# Patient Record
Sex: FEMALE | Race: ASIAN | NOT HISPANIC OR LATINO
[De-identification: names, ages, dates, MRNs, and addresses within clinical notes are randomized per-mention and may not be internally consistent; named-entity substitution may affect disease eponyms.]

---

## 2017-04-10 PROBLEM — Z00.00 ENCOUNTER FOR PREVENTIVE HEALTH EXAMINATION: Status: ACTIVE | Noted: 2017-04-10

## 2017-04-11 ENCOUNTER — ASOB RESULT (OUTPATIENT)
Age: 37
End: 2017-04-11

## 2017-04-11 ENCOUNTER — APPOINTMENT (OUTPATIENT)
Dept: ANTEPARTUM | Facility: CLINIC | Age: 37
End: 2017-04-11

## 2017-07-24 ENCOUNTER — ASOB RESULT (OUTPATIENT)
Age: 37
End: 2017-07-24

## 2017-07-24 ENCOUNTER — APPOINTMENT (OUTPATIENT)
Dept: ANTEPARTUM | Facility: CLINIC | Age: 37
End: 2017-07-24

## 2017-08-17 ENCOUNTER — TRANSCRIPTION ENCOUNTER (OUTPATIENT)
Age: 37
End: 2017-08-17

## 2017-08-17 ENCOUNTER — INPATIENT (INPATIENT)
Facility: HOSPITAL | Age: 37
LOS: 1 days | Discharge: HOME | End: 2017-08-19
Attending: OBSTETRICS & GYNECOLOGY | Admitting: OBSTETRICS & GYNECOLOGY
Payer: COMMERCIAL

## 2017-08-17 VITALS
HEART RATE: 76 BPM | TEMPERATURE: 99 F | SYSTOLIC BLOOD PRESSURE: 138 MMHG | DIASTOLIC BLOOD PRESSURE: 57 MMHG | RESPIRATION RATE: 18 BRPM

## 2017-08-17 DIAGNOSIS — O26.899 OTHER SPECIFIED PREGNANCY RELATED CONDITIONS, UNSPECIFIED TRIMESTER: ICD-10-CM

## 2017-08-17 DIAGNOSIS — Z3A.00 WEEKS OF GESTATION OF PREGNANCY NOT SPECIFIED: ICD-10-CM

## 2017-08-17 DIAGNOSIS — Z34.80 ENCOUNTER FOR SUPERVISION OF OTHER NORMAL PREGNANCY, UNSPECIFIED TRIMESTER: ICD-10-CM

## 2017-08-17 LAB
BASOPHILS # BLD AUTO: 0 K/UL — SIGNIFICANT CHANGE UP (ref 0–0.2)
BASOPHILS NFR BLD AUTO: 0.3 % — SIGNIFICANT CHANGE UP (ref 0–2)
BLD GP AB SCN SERPL QL: NEGATIVE — SIGNIFICANT CHANGE UP
EOSINOPHIL # BLD AUTO: 0 K/UL — SIGNIFICANT CHANGE UP (ref 0–0.5)
EOSINOPHIL NFR BLD AUTO: 0 % — SIGNIFICANT CHANGE UP (ref 0–6)
HCT VFR BLD CALC: 35.5 % — SIGNIFICANT CHANGE UP (ref 34.5–45)
HGB BLD-MCNC: 12.2 G/DL — SIGNIFICANT CHANGE UP (ref 11.5–15.5)
LYMPHOCYTES # BLD AUTO: 1.4 K/UL — SIGNIFICANT CHANGE UP (ref 1–3.3)
LYMPHOCYTES # BLD AUTO: 8.1 % — LOW (ref 13–44)
MCHC RBC-ENTMCNC: 24.7 PG — LOW (ref 27–34)
MCHC RBC-ENTMCNC: 34.4 GM/DL — SIGNIFICANT CHANGE UP (ref 32–36)
MCV RBC AUTO: 71.8 FL — LOW (ref 80–100)
MONOCYTES # BLD AUTO: 0.8 K/UL — SIGNIFICANT CHANGE UP (ref 0–0.9)
MONOCYTES NFR BLD AUTO: 4.9 % — SIGNIFICANT CHANGE UP (ref 2–14)
NEUTROPHILS # BLD AUTO: 15.1 K/UL — HIGH (ref 1.8–7.4)
NEUTROPHILS NFR BLD AUTO: 86.7 % — HIGH (ref 43–77)
PLATELET # BLD AUTO: 249 K/UL — SIGNIFICANT CHANGE UP (ref 150–400)
RBC # BLD: 4.95 M/UL — SIGNIFICANT CHANGE UP (ref 3.8–5.2)
RBC # FLD: 14.4 % — SIGNIFICANT CHANGE UP (ref 10.3–14.5)
RH IG SCN BLD-IMP: POSITIVE — SIGNIFICANT CHANGE UP
RH IG SCN BLD-IMP: POSITIVE — SIGNIFICANT CHANGE UP
WBC # BLD: 17.4 K/UL — HIGH (ref 3.8–10.5)
WBC # FLD AUTO: 17.4 K/UL — HIGH (ref 3.8–10.5)

## 2017-08-17 RX ORDER — ACETAMINOPHEN 500 MG
3 TABLET ORAL
Qty: 0 | Refills: 0 | DISCHARGE
Start: 2017-08-17

## 2017-08-17 RX ORDER — ACETAMINOPHEN 500 MG
975 TABLET ORAL EVERY 6 HOURS
Qty: 0 | Refills: 0 | Status: DISCONTINUED | OUTPATIENT
Start: 2017-08-17 | End: 2017-08-19

## 2017-08-17 RX ORDER — SODIUM CHLORIDE 9 MG/ML
3 INJECTION INTRAMUSCULAR; INTRAVENOUS; SUBCUTANEOUS EVERY 8 HOURS
Qty: 0 | Refills: 0 | Status: DISCONTINUED | OUTPATIENT
Start: 2017-08-17 | End: 2017-08-19

## 2017-08-17 RX ORDER — CITRIC ACID/SODIUM CITRATE 300-500 MG
15 SOLUTION, ORAL ORAL EVERY 4 HOURS
Qty: 0 | Refills: 0 | Status: DISCONTINUED | OUTPATIENT
Start: 2017-08-17 | End: 2017-08-17

## 2017-08-17 RX ORDER — ACETAMINOPHEN 500 MG
975 TABLET ORAL EVERY 6 HOURS
Qty: 0 | Refills: 0 | Status: COMPLETED | OUTPATIENT
Start: 2017-08-17 | End: 2018-07-16

## 2017-08-17 RX ORDER — MAGNESIUM HYDROXIDE 400 MG/1
30 TABLET, CHEWABLE ORAL
Qty: 0 | Refills: 0 | Status: DISCONTINUED | OUTPATIENT
Start: 2017-08-17 | End: 2017-08-19

## 2017-08-17 RX ORDER — DIPHENHYDRAMINE HCL 50 MG
25 CAPSULE ORAL EVERY 6 HOURS
Qty: 0 | Refills: 0 | Status: DISCONTINUED | OUTPATIENT
Start: 2017-08-17 | End: 2017-08-19

## 2017-08-17 RX ORDER — DOCUSATE SODIUM 100 MG
100 CAPSULE ORAL
Qty: 0 | Refills: 0 | Status: DISCONTINUED | OUTPATIENT
Start: 2017-08-17 | End: 2017-08-18

## 2017-08-17 RX ORDER — DIBUCAINE 1 %
1 OINTMENT (GRAM) RECTAL EVERY 4 HOURS
Qty: 0 | Refills: 0 | Status: DISCONTINUED | OUTPATIENT
Start: 2017-08-17 | End: 2017-08-19

## 2017-08-17 RX ORDER — KETOROLAC TROMETHAMINE 30 MG/ML
30 SYRINGE (ML) INJECTION ONCE
Qty: 0 | Refills: 0 | Status: DISCONTINUED | OUTPATIENT
Start: 2017-08-17 | End: 2017-08-17

## 2017-08-17 RX ORDER — SODIUM CHLORIDE 9 MG/ML
3 INJECTION INTRAMUSCULAR; INTRAVENOUS; SUBCUTANEOUS EVERY 8 HOURS
Qty: 0 | Refills: 0 | Status: DISCONTINUED | OUTPATIENT
Start: 2017-08-17 | End: 2017-08-17

## 2017-08-17 RX ORDER — OXYTOCIN 10 UNIT/ML
41.67 VIAL (ML) INJECTION
Qty: 20 | Refills: 0 | Status: DISCONTINUED | OUTPATIENT
Start: 2017-08-17 | End: 2017-08-17

## 2017-08-17 RX ORDER — IBUPROFEN 200 MG
1 TABLET ORAL
Qty: 0 | Refills: 0 | DISCHARGE
Start: 2017-08-17

## 2017-08-17 RX ORDER — OXYTOCIN 10 UNIT/ML
41.67 VIAL (ML) INJECTION
Qty: 20 | Refills: 0 | Status: DISCONTINUED | OUTPATIENT
Start: 2017-08-17 | End: 2017-08-19

## 2017-08-17 RX ORDER — HYDROCORTISONE 1 %
1 OINTMENT (GRAM) TOPICAL EVERY 4 HOURS
Qty: 0 | Refills: 0 | Status: DISCONTINUED | OUTPATIENT
Start: 2017-08-17 | End: 2017-08-17

## 2017-08-17 RX ORDER — AER TRAVELER 0.5 G/1
1 SOLUTION RECTAL; TOPICAL EVERY 4 HOURS
Qty: 0 | Refills: 0 | Status: DISCONTINUED | OUTPATIENT
Start: 2017-08-17 | End: 2017-08-17

## 2017-08-17 RX ORDER — PRAMOXINE HYDROCHLORIDE 150 MG/15G
1 AEROSOL, FOAM RECTAL EVERY 4 HOURS
Qty: 0 | Refills: 0 | Status: DISCONTINUED | OUTPATIENT
Start: 2017-08-17 | End: 2017-08-17

## 2017-08-17 RX ORDER — SODIUM CHLORIDE 9 MG/ML
500 INJECTION, SOLUTION INTRAVENOUS ONCE
Qty: 0 | Refills: 0 | Status: DISCONTINUED | OUTPATIENT
Start: 2017-08-17 | End: 2017-08-17

## 2017-08-17 RX ORDER — OXYTOCIN 10 UNIT/ML
333.33 VIAL (ML) INJECTION
Qty: 20 | Refills: 0 | Status: DISCONTINUED | OUTPATIENT
Start: 2017-08-17 | End: 2017-08-17

## 2017-08-17 RX ORDER — AER TRAVELER 0.5 G/1
1 SOLUTION RECTAL; TOPICAL EVERY 4 HOURS
Qty: 0 | Refills: 0 | Status: DISCONTINUED | OUTPATIENT
Start: 2017-08-17 | End: 2017-08-19

## 2017-08-17 RX ORDER — SIMETHICONE 80 MG/1
80 TABLET, CHEWABLE ORAL EVERY 6 HOURS
Qty: 0 | Refills: 0 | Status: DISCONTINUED | OUTPATIENT
Start: 2017-08-17 | End: 2017-08-19

## 2017-08-17 RX ORDER — SODIUM CHLORIDE 9 MG/ML
1000 INJECTION, SOLUTION INTRAVENOUS
Qty: 0 | Refills: 0 | Status: DISCONTINUED | OUTPATIENT
Start: 2017-08-17 | End: 2017-08-17

## 2017-08-17 RX ORDER — OXYCODONE HYDROCHLORIDE 5 MG/1
5 TABLET ORAL
Qty: 0 | Refills: 0 | Status: DISCONTINUED | OUTPATIENT
Start: 2017-08-17 | End: 2017-08-19

## 2017-08-17 RX ORDER — LANOLIN
1 OINTMENT (GRAM) TOPICAL EVERY 6 HOURS
Qty: 0 | Refills: 0 | Status: DISCONTINUED | OUTPATIENT
Start: 2017-08-17 | End: 2017-08-19

## 2017-08-17 RX ORDER — TETANUS TOXOID, REDUCED DIPHTHERIA TOXOID AND ACELLULAR PERTUSSIS VACCINE, ADSORBED 5; 2.5; 8; 8; 2.5 [IU]/.5ML; [IU]/.5ML; UG/.5ML; UG/.5ML; UG/.5ML
0.5 SUSPENSION INTRAMUSCULAR ONCE
Qty: 0 | Refills: 0 | Status: DISCONTINUED | OUTPATIENT
Start: 2017-08-17 | End: 2017-08-19

## 2017-08-17 RX ORDER — PRAMOXINE HYDROCHLORIDE 150 MG/15G
1 AEROSOL, FOAM RECTAL EVERY 4 HOURS
Qty: 0 | Refills: 0 | Status: DISCONTINUED | OUTPATIENT
Start: 2017-08-17 | End: 2017-08-19

## 2017-08-17 RX ORDER — OXYCODONE HYDROCHLORIDE 5 MG/1
5 TABLET ORAL EVERY 4 HOURS
Qty: 0 | Refills: 0 | Status: DISCONTINUED | OUTPATIENT
Start: 2017-08-17 | End: 2017-08-19

## 2017-08-17 RX ORDER — HYDROCORTISONE 1 %
1 OINTMENT (GRAM) TOPICAL EVERY 4 HOURS
Qty: 0 | Refills: 0 | Status: DISCONTINUED | OUTPATIENT
Start: 2017-08-17 | End: 2017-08-19

## 2017-08-17 RX ORDER — BENZOCAINE 10 %
1 GEL (GRAM) MUCOUS MEMBRANE EVERY 6 HOURS
Qty: 0 | Refills: 0 | Status: DISCONTINUED | OUTPATIENT
Start: 2017-08-17 | End: 2017-08-19

## 2017-08-17 RX ORDER — DIBUCAINE 1 %
1 OINTMENT (GRAM) RECTAL EVERY 4 HOURS
Qty: 0 | Refills: 0 | Status: DISCONTINUED | OUTPATIENT
Start: 2017-08-17 | End: 2017-08-17

## 2017-08-17 RX ORDER — IBUPROFEN 200 MG
600 TABLET ORAL EVERY 6 HOURS
Qty: 0 | Refills: 0 | Status: DISCONTINUED | OUTPATIENT
Start: 2017-08-17 | End: 2017-08-19

## 2017-08-17 RX ORDER — GLYCERIN ADULT
1 SUPPOSITORY, RECTAL RECTAL AT BEDTIME
Qty: 0 | Refills: 0 | Status: DISCONTINUED | OUTPATIENT
Start: 2017-08-17 | End: 2017-08-19

## 2017-08-17 RX ORDER — IBUPROFEN 200 MG
600 TABLET ORAL EVERY 6 HOURS
Qty: 0 | Refills: 0 | Status: COMPLETED | OUTPATIENT
Start: 2017-08-17 | End: 2018-07-16

## 2017-08-17 RX ADMIN — Medication 125 MILLIUNIT(S)/MIN: at 05:37

## 2017-08-17 RX ADMIN — Medication 600 MILLIGRAM(S): at 20:54

## 2017-08-17 RX ADMIN — SODIUM CHLORIDE 125 MILLILITER(S): 9 INJECTION, SOLUTION INTRAVENOUS at 03:20

## 2017-08-17 RX ADMIN — Medication 600 MILLIGRAM(S): at 20:25

## 2017-08-17 RX ADMIN — Medication 600 MILLIGRAM(S): at 15:00

## 2017-08-17 RX ADMIN — Medication 100 MILLIGRAM(S): at 14:03

## 2017-08-17 RX ADMIN — Medication 30 MILLIGRAM(S): at 07:45

## 2017-08-17 RX ADMIN — Medication 1 TABLET(S): at 14:03

## 2017-08-17 RX ADMIN — Medication 975 MILLIGRAM(S): at 09:22

## 2017-08-17 RX ADMIN — Medication 975 MILLIGRAM(S): at 18:40

## 2017-08-17 RX ADMIN — Medication 600 MILLIGRAM(S): at 14:03

## 2017-08-17 RX ADMIN — Medication 975 MILLIGRAM(S): at 10:20

## 2017-08-17 RX ADMIN — Medication 975 MILLIGRAM(S): at 17:44

## 2017-08-17 NOTE — DISCHARGE NOTE OB - PLAN OF CARE
Return to normal activity Regular diet as tolerated. Ambulate as tolerated. Regular diet as tolerated. Ambulate as tolerated., f/u 6 weeks

## 2017-08-17 NOTE — DISCHARGE NOTE OB - CARE PROVIDER_API CALL
Angel Hernández (MD), Obstetrics and Gynecology  [address] [place] [address]  Phone: [phone]  Fax: [phone] Angel Hernández (MD), Obstetrics and Gynecology  [address] [place] [address]  Phone: [phone]  Fax: [phone]

## 2017-08-17 NOTE — DISCHARGE NOTE OB - HOSPITAL COURSE
Section Delivered. Uncomplicated Postpartum course. Will follow up as an outpatient. term pregnancy, labor,  viable female infant, midline laceration repair, post partum uncomplicated

## 2017-08-17 NOTE — DISCHARGE NOTE OB - PATIENT PORTAL LINK FT
“You can access the [facility] Patient Portal, offered by [facility], by registering with the following website: [email or web address]”

## 2017-08-17 NOTE — DISCHARGE NOTE OB - MATERIALS PROVIDED
[facility] Hearing Screen Program/Breastfeeding Mother’s Support Group Information/[facility] [place] Screening Program/Breastfeeding Guide and Packet/Breastfeeding Log/Birth Certificate Instructions/Guide to Postpartum Care/MMR Vaccination (VIS Pub Date: 2012)

## 2017-08-17 NOTE — DISCHARGE NOTE OB - CARE PLAN
Principal Discharge DX:	 delivery delivered  Goal:	Return to normal activity  Instructions for follow-up, activity and diet:	Regular diet as tolerated. Ambulate as tolerated. Principal Discharge DX:	Vaginal delivery  Goal:	Return to normal activity  Instructions for follow-up, activity and diet:	Regular diet as tolerated. Ambulate as tolerated., f/u 6 weeks

## 2017-08-18 LAB
HCT VFR BLD CALC: 26.3 % — LOW (ref 34.5–45)
HGB BLD-MCNC: 8.6 G/DL — LOW (ref 11.5–15.5)

## 2017-08-18 RX ORDER — DOCUSATE SODIUM 100 MG
100 CAPSULE ORAL THREE TIMES A DAY
Qty: 0 | Refills: 0 | Status: DISCONTINUED | OUTPATIENT
Start: 2017-08-18 | End: 2017-08-19

## 2017-08-18 RX ORDER — ASCORBIC ACID 60 MG
250 TABLET,CHEWABLE ORAL THREE TIMES A DAY
Qty: 0 | Refills: 0 | Status: DISCONTINUED | OUTPATIENT
Start: 2017-08-18 | End: 2017-08-19

## 2017-08-18 RX ORDER — FERROUS SULFATE 325(65) MG
325 TABLET ORAL
Qty: 0 | Refills: 0 | Status: DISCONTINUED | OUTPATIENT
Start: 2017-08-18 | End: 2017-08-19

## 2017-08-18 RX ADMIN — Medication 250 MILLIGRAM(S): at 23:20

## 2017-08-18 RX ADMIN — Medication 600 MILLIGRAM(S): at 06:25

## 2017-08-18 RX ADMIN — Medication 100 MILLIGRAM(S): at 23:20

## 2017-08-18 RX ADMIN — Medication 600 MILLIGRAM(S): at 05:56

## 2017-08-18 RX ADMIN — Medication 1 TABLET(S): at 11:54

## 2017-08-18 RX ADMIN — Medication 975 MILLIGRAM(S): at 00:05

## 2017-08-18 RX ADMIN — Medication 975 MILLIGRAM(S): at 00:35

## 2017-08-18 RX ADMIN — Medication 600 MILLIGRAM(S): at 12:24

## 2017-08-18 RX ADMIN — Medication 600 MILLIGRAM(S): at 23:50

## 2017-08-18 RX ADMIN — Medication 600 MILLIGRAM(S): at 18:13

## 2017-08-18 RX ADMIN — Medication 600 MILLIGRAM(S): at 23:20

## 2017-08-18 RX ADMIN — Medication 600 MILLIGRAM(S): at 11:54

## 2017-08-18 RX ADMIN — Medication 325 MILLIGRAM(S): at 23:20

## 2017-08-18 NOTE — PROGRESS NOTE ADULT - PROBLEM SELECTOR PLAN 1
- Continue with po analgesia  - Increase ambulation  - Continue regular diet  - IV lock  - H&H today    SHIRA Landis, PGY-1

## 2017-08-18 NOTE — PROGRESS NOTE ADULT - SUBJECTIVE AND OBJECTIVE BOX
Patient seen and examined at bedside, no acute overnight events. No acute complaints, pain well controlled. Patient is ambulating, voiding spontaneously, passing gas, and tolerating regular diet. Pt is breast feeding her baby.    Vital Signs Last 24 Hours  T(C): 36.8 (08-18-17 @ 06:15), Max: 37 (08-17-17 @ 17:00)  HR: 63 (08-18-17 @ 06:15) (63 - 81)  BP: 103/61 (08-18-17 @ 06:15) (99/66 - 111/68)  RR: 18 (08-18-17 @ 06:15) (18 - 18)  SpO2: --    Physical Exam:  General: NAD  Abdomen: Soft, non-tender, non-distended  Pelvic: Lochia wnl; fundus firm and non-tender   Extremities: no calf tenderness noted on exam    Labs:    Blood Type: O Positive               12.2   17.4  )-----------( 249      ( 08-17 @ 04:06 )             35.5         MEDICATIONS  (STANDING):  oxyCODONE    IR 5 milliGRAM(s) Oral every 3 hours  sodium chloride 0.9% lock flush 3 milliLiter(s) IV Push every 8 hours  diphtheria/tetanus/pertussis (acellular) Vaccine (ADAcel) 0.5 milliLiter(s) IntraMuscular once  oxytocin Infusion 41.667 milliUNIT(s)/Min (125 mL/Hr) IV Continuous <Continuous>  prenatal multivitamin 1 Tablet(s) Oral daily  acetaminophen   Tablet. 975 milliGRAM(s) Oral every 6 hours  ibuprofen  Tablet 600 milliGRAM(s) Oral every 6 hours  measles/mumps/rubella Vaccine 0.5 milliLiter(s) SubCutaneous once    MEDICATIONS  (PRN):  oxyCODONE    IR 5 milliGRAM(s) Oral every 4 hours PRN Severe Pain (7 -10)  hydrocortisone 1% Cream 1 Application(s) Topical every 4 hours PRN Moderate to Severe Perineal Pain  pramoxine 1%/zinc 5% Cream 1 Application(s) Topical every 4 hours PRN Moderate to Severe Perineal Pain  benzocaine 20%/menthol 0.5% Spray 1 Spray(s) Topical every 6 hours PRN Perineal discomfort  dibucaine 1% Ointment 1 Application(s) Topical every 4 hours PRN Perineal Discomfort  lanolin Ointment 1 Application(s) Topical every 6 hours PRN Sore Nipples  witch hazel Pads 1 Application(s) Topical every 4 hours PRN Perineal Discomfort  simethicone 80 milliGRAM(s) Chew every 6 hours PRN Gas  diphenhydrAMINE   Capsule 25 milliGRAM(s) Oral every 6 hours PRN Itching  glycerin Suppository - Adult 1 Suppository(s) Rectal at bedtime PRN Constipation  magnesium hydroxide Suspension 30 milliLiter(s) Oral two times a day PRN Constipation  docusate sodium 100 milliGRAM(s) Oral two times a day PRN Stool Softening

## 2017-08-18 NOTE — PROGRESS NOTE ADULT - ATTENDING COMMENTS
Patient seen and examined by me.   Agree with above assessment and plan.  Continue with current care.

## 2017-08-18 NOTE — CHART NOTE - NSCHARTNOTEFT_GEN_A_CORE
NP NOTE     Day 1    Vital Signs Last 24 Hrs  T(C): 36.8 (18 Aug 2017 06:15), Max: 37 (17 Aug 2017 17:00)  T(F): 98.3 (18 Aug 2017 06:15), Max: 98.6 (17 Aug 2017 17:00)  HR: 63 (18 Aug 2017 06:15) (63 - 79)  BP: 103/61 (18 Aug 2017 06:15) (103/61 - 111/68)  BP(mean): --  RR: 18 (18 Aug 2017 06:15) (18 - 18)  SpO2: --                          8.6    x     )-----------( x        ( 18 Aug 2017 07:21 )             26.3     8.6/26.3      Plan:  - Ferrous Sulfate, Colace, Vitamin C supplementation.  - Monitor for signs/symptoms of anemia.    Mary Eller NP

## 2017-08-19 VITALS
DIASTOLIC BLOOD PRESSURE: 73 MMHG | RESPIRATION RATE: 18 BRPM | HEART RATE: 76 BPM | TEMPERATURE: 98 F | SYSTOLIC BLOOD PRESSURE: 107 MMHG

## 2017-08-19 LAB — T PALLIDUM AB TITR SER: NEGATIVE — SIGNIFICANT CHANGE UP

## 2017-08-19 PROCEDURE — 86900 BLOOD TYPING SEROLOGIC ABO: CPT

## 2017-08-19 PROCEDURE — 86780 TREPONEMA PALLIDUM: CPT

## 2017-08-19 PROCEDURE — 59050 FETAL MONITOR W/REPORT: CPT

## 2017-08-19 PROCEDURE — 85018 HEMOGLOBIN: CPT

## 2017-08-19 PROCEDURE — 86850 RBC ANTIBODY SCREEN: CPT

## 2017-08-19 PROCEDURE — 59025 FETAL NON-STRESS TEST: CPT

## 2017-08-19 PROCEDURE — G0463: CPT

## 2017-08-19 PROCEDURE — 90707 MMR VACCINE SC: CPT

## 2017-08-19 PROCEDURE — 85027 COMPLETE CBC AUTOMATED: CPT

## 2017-08-19 PROCEDURE — 86901 BLOOD TYPING SEROLOGIC RH(D): CPT

## 2017-08-19 RX ADMIN — Medication 600 MILLIGRAM(S): at 05:37

## 2017-08-19 RX ADMIN — Medication 600 MILLIGRAM(S): at 06:07

## 2017-08-19 RX ADMIN — Medication 325 MILLIGRAM(S): at 08:08

## 2017-08-19 RX ADMIN — Medication 100 MILLIGRAM(S): at 11:34

## 2017-08-19 RX ADMIN — Medication 600 MILLIGRAM(S): at 11:34

## 2017-08-19 RX ADMIN — Medication 100 MILLIGRAM(S): at 05:37

## 2017-08-19 RX ADMIN — Medication 250 MILLIGRAM(S): at 05:37

## 2017-08-19 RX ADMIN — Medication 0.5 MILLILITER(S): at 10:21

## 2017-08-19 RX ADMIN — Medication 600 MILLIGRAM(S): at 12:00

## 2017-08-19 RX ADMIN — Medication 1 TABLET(S): at 11:34

## 2017-08-19 NOTE — PROGRESS NOTE ADULT - SUBJECTIVE AND OBJECTIVE BOX
PPD#2    S: Patient doing well. Minimal lochia. Pain controlled.    O: Vital Signs Last 24 Hrs  T(C): 36.7 (19 Aug 2017 06:25), Max: 36.7 (19 Aug 2017 06:25)  T(F): 98 (19 Aug 2017 06:25), Max: 98 (19 Aug 2017 06:25)  HR: 76 (19 Aug 2017 06:25) (76 - 83)  BP: 107/73 (19 Aug 2017 06:25) (107/73 - 113/75)  BP(mean): --  RR: 18 (19 Aug 2017 06:25) (18 - 18)  SpO2: 97% (18 Aug 2017 18:10) (97% - 97%)    Gen: NAD  Abd: soft, NT, ND, fundus firm below umbilicus  Lochia: moderate  Ext: no tenderness    Labs:                        8.6    x     )-----------( x        ( 18 Aug 2017 07:21 )             26.3       A: 37y PPD# s/p  doing well.    Plan:  Analgesia prn  Regular diet  Discharge instruction given

## 2019-02-13 ENCOUNTER — LABORATORY RESULT (OUTPATIENT)
Age: 39
End: 2019-02-13

## 2019-02-13 ENCOUNTER — ASOB RESULT (OUTPATIENT)
Age: 39
End: 2019-02-13

## 2019-02-13 ENCOUNTER — APPOINTMENT (OUTPATIENT)
Dept: ANTEPARTUM | Facility: CLINIC | Age: 39
End: 2019-02-13
Payer: COMMERCIAL

## 2019-02-13 PROCEDURE — 36415 COLL VENOUS BLD VENIPUNCTURE: CPT

## 2019-02-13 PROCEDURE — 76813 OB US NUCHAL MEAS 1 GEST: CPT

## 2019-02-13 PROCEDURE — 76801 OB US < 14 WKS SINGLE FETUS: CPT

## 2019-04-08 ENCOUNTER — ASOB RESULT (OUTPATIENT)
Age: 39
End: 2019-04-08

## 2019-04-08 ENCOUNTER — APPOINTMENT (OUTPATIENT)
Dept: ANTEPARTUM | Facility: CLINIC | Age: 39
End: 2019-04-08
Payer: COMMERCIAL

## 2019-04-08 PROCEDURE — 76811 OB US DETAILED SNGL FETUS: CPT

## 2019-08-17 ENCOUNTER — INPATIENT (INPATIENT)
Facility: HOSPITAL | Age: 39
LOS: 1 days | Discharge: HOME | End: 2019-08-19
Attending: OBSTETRICS & GYNECOLOGY | Admitting: OBSTETRICS & GYNECOLOGY
Payer: COMMERCIAL

## 2019-08-17 VITALS
DIASTOLIC BLOOD PRESSURE: 65 MMHG | RESPIRATION RATE: 14 BRPM | TEMPERATURE: 98 F | HEART RATE: 88 BPM | SYSTOLIC BLOOD PRESSURE: 112 MMHG

## 2019-08-17 DIAGNOSIS — Z3A.00 WEEKS OF GESTATION OF PREGNANCY NOT SPECIFIED: ICD-10-CM

## 2019-08-17 DIAGNOSIS — Z34.80 ENCOUNTER FOR SUPERVISION OF OTHER NORMAL PREGNANCY, UNSPECIFIED TRIMESTER: ICD-10-CM

## 2019-08-17 DIAGNOSIS — O26.899 OTHER SPECIFIED PREGNANCY RELATED CONDITIONS, UNSPECIFIED TRIMESTER: ICD-10-CM

## 2019-08-17 LAB
BLD GP AB SCN SERPL QL: NEGATIVE — SIGNIFICANT CHANGE UP
HCT VFR BLD CALC: 31.8 % — LOW (ref 34.5–45)
HGB BLD-MCNC: 10.7 G/DL — LOW (ref 11.5–15.5)
MCHC RBC-ENTMCNC: 23 PG — LOW (ref 27–34)
MCHC RBC-ENTMCNC: 33.6 GM/DL — SIGNIFICANT CHANGE UP (ref 32–36)
MCV RBC AUTO: 68.4 FL — LOW (ref 80–100)
PLATELET # BLD AUTO: 297 K/UL — SIGNIFICANT CHANGE UP (ref 150–400)
RBC # BLD: 4.65 M/UL — SIGNIFICANT CHANGE UP (ref 3.8–5.2)
RBC # FLD: 16 % — HIGH (ref 10.3–14.5)
RH IG SCN BLD-IMP: POSITIVE — SIGNIFICANT CHANGE UP
WBC # BLD: 12.8 K/UL — HIGH (ref 3.8–10.5)
WBC # FLD AUTO: 12.8 K/UL — HIGH (ref 3.8–10.5)

## 2019-08-17 RX ORDER — OXYCODONE HYDROCHLORIDE 5 MG/1
5 TABLET ORAL ONCE
Refills: 0 | Status: DISCONTINUED | OUTPATIENT
Start: 2019-08-17 | End: 2019-08-19

## 2019-08-17 RX ORDER — DIPHENHYDRAMINE HCL 50 MG
25 CAPSULE ORAL EVERY 6 HOURS
Refills: 0 | Status: DISCONTINUED | OUTPATIENT
Start: 2019-08-17 | End: 2019-08-19

## 2019-08-17 RX ORDER — SODIUM CHLORIDE 9 MG/ML
500 INJECTION, SOLUTION INTRAVENOUS ONCE
Refills: 0 | Status: COMPLETED | OUTPATIENT
Start: 2019-08-17 | End: 2019-08-17

## 2019-08-17 RX ORDER — LANOLIN
1 OINTMENT (GRAM) TOPICAL EVERY 6 HOURS
Refills: 0 | Status: DISCONTINUED | OUTPATIENT
Start: 2019-08-17 | End: 2019-08-19

## 2019-08-17 RX ORDER — GLYCERIN ADULT
1 SUPPOSITORY, RECTAL RECTAL AT BEDTIME
Refills: 0 | Status: DISCONTINUED | OUTPATIENT
Start: 2019-08-17 | End: 2019-08-19

## 2019-08-17 RX ORDER — AER TRAVELER 0.5 G/1
1 SOLUTION RECTAL; TOPICAL EVERY 4 HOURS
Refills: 0 | Status: DISCONTINUED | OUTPATIENT
Start: 2019-08-17 | End: 2019-08-19

## 2019-08-17 RX ORDER — TETANUS TOXOID, REDUCED DIPHTHERIA TOXOID AND ACELLULAR PERTUSSIS VACCINE, ADSORBED 5; 2.5; 8; 8; 2.5 [IU]/.5ML; [IU]/.5ML; UG/.5ML; UG/.5ML; UG/.5ML
0.5 SUSPENSION INTRAMUSCULAR ONCE
Refills: 0 | Status: DISCONTINUED | OUTPATIENT
Start: 2019-08-17 | End: 2019-08-19

## 2019-08-17 RX ORDER — OXYTOCIN 10 UNIT/ML
333.33 VIAL (ML) INJECTION
Qty: 20 | Refills: 0 | Status: DISCONTINUED | OUTPATIENT
Start: 2019-08-17 | End: 2019-08-19

## 2019-08-17 RX ORDER — IBUPROFEN 200 MG
600 TABLET ORAL EVERY 6 HOURS
Refills: 0 | Status: COMPLETED | OUTPATIENT
Start: 2019-08-17 | End: 2020-07-15

## 2019-08-17 RX ORDER — KETOROLAC TROMETHAMINE 30 MG/ML
30 SYRINGE (ML) INJECTION ONCE
Refills: 0 | Status: DISCONTINUED | OUTPATIENT
Start: 2019-08-17 | End: 2019-08-17

## 2019-08-17 RX ORDER — DOCUSATE SODIUM 100 MG
100 CAPSULE ORAL
Refills: 0 | Status: DISCONTINUED | OUTPATIENT
Start: 2019-08-17 | End: 2019-08-19

## 2019-08-17 RX ORDER — OXYCODONE HYDROCHLORIDE 5 MG/1
5 TABLET ORAL
Refills: 0 | Status: DISCONTINUED | OUTPATIENT
Start: 2019-08-17 | End: 2019-08-19

## 2019-08-17 RX ORDER — MAGNESIUM HYDROXIDE 400 MG/1
30 TABLET, CHEWABLE ORAL
Refills: 0 | Status: DISCONTINUED | OUTPATIENT
Start: 2019-08-17 | End: 2019-08-19

## 2019-08-17 RX ORDER — BENZOCAINE 10 %
1 GEL (GRAM) MUCOUS MEMBRANE EVERY 6 HOURS
Refills: 0 | Status: DISCONTINUED | OUTPATIENT
Start: 2019-08-17 | End: 2019-08-19

## 2019-08-17 RX ORDER — HYDROCORTISONE 1 %
1 OINTMENT (GRAM) TOPICAL EVERY 6 HOURS
Refills: 0 | Status: DISCONTINUED | OUTPATIENT
Start: 2019-08-17 | End: 2019-08-19

## 2019-08-17 RX ORDER — SIMETHICONE 80 MG/1
80 TABLET, CHEWABLE ORAL EVERY 4 HOURS
Refills: 0 | Status: DISCONTINUED | OUTPATIENT
Start: 2019-08-17 | End: 2019-08-19

## 2019-08-17 RX ORDER — DIBUCAINE 1 %
1 OINTMENT (GRAM) RECTAL EVERY 6 HOURS
Refills: 0 | Status: DISCONTINUED | OUTPATIENT
Start: 2019-08-17 | End: 2019-08-19

## 2019-08-17 RX ORDER — IBUPROFEN 200 MG
600 TABLET ORAL EVERY 6 HOURS
Refills: 0 | Status: DISCONTINUED | OUTPATIENT
Start: 2019-08-17 | End: 2019-08-19

## 2019-08-17 RX ORDER — PRAMOXINE HYDROCHLORIDE 150 MG/15G
1 AEROSOL, FOAM RECTAL EVERY 4 HOURS
Refills: 0 | Status: DISCONTINUED | OUTPATIENT
Start: 2019-08-17 | End: 2019-08-19

## 2019-08-17 RX ORDER — SODIUM CHLORIDE 9 MG/ML
3 INJECTION INTRAMUSCULAR; INTRAVENOUS; SUBCUTANEOUS EVERY 8 HOURS
Refills: 0 | Status: DISCONTINUED | OUTPATIENT
Start: 2019-08-17 | End: 2019-08-19

## 2019-08-17 RX ORDER — ACETAMINOPHEN 500 MG
975 TABLET ORAL
Refills: 0 | Status: DISCONTINUED | OUTPATIENT
Start: 2019-08-17 | End: 2019-08-19

## 2019-08-17 RX ADMIN — Medication 30 MILLIGRAM(S): at 15:15

## 2019-08-17 RX ADMIN — Medication 1000 MILLIUNIT(S)/MIN: at 15:13

## 2019-08-17 RX ADMIN — SODIUM CHLORIDE 1000 MILLILITER(S): 9 INJECTION, SOLUTION INTRAVENOUS at 15:00

## 2019-08-17 RX ADMIN — Medication 975 MILLIGRAM(S): at 21:59

## 2019-08-17 RX ADMIN — Medication 975 MILLIGRAM(S): at 21:29

## 2019-08-18 LAB
HCT VFR BLD CALC: 22.9 % — LOW (ref 34.5–45)
HCT VFR BLD CALC: 23.6 % — LOW (ref 34.5–45)
HGB BLD-MCNC: 7.2 G/DL — LOW (ref 11.5–15.5)
HGB BLD-MCNC: 7.5 G/DL — LOW (ref 11.5–15.5)
MCHC RBC-ENTMCNC: 22 PG — LOW (ref 27–34)
MCHC RBC-ENTMCNC: 32 GM/DL — SIGNIFICANT CHANGE UP (ref 32–36)
MCV RBC AUTO: 68.8 FL — LOW (ref 80–100)
PLATELET # BLD AUTO: 290 K/UL — SIGNIFICANT CHANGE UP (ref 150–400)
RBC # BLD: 3.43 M/UL — LOW (ref 3.8–5.2)
RBC # FLD: 16.1 % — HIGH (ref 10.3–14.5)
T PALLIDUM AB TITR SER: NEGATIVE — SIGNIFICANT CHANGE UP
WBC # BLD: 14.8 K/UL — HIGH (ref 3.8–10.5)
WBC # FLD AUTO: 14.8 K/UL — HIGH (ref 3.8–10.5)

## 2019-08-18 RX ADMIN — Medication 600 MILLIGRAM(S): at 18:45

## 2019-08-18 RX ADMIN — Medication 975 MILLIGRAM(S): at 21:37

## 2019-08-18 RX ADMIN — Medication 1 TABLET(S): at 12:12

## 2019-08-18 RX ADMIN — Medication 600 MILLIGRAM(S): at 06:23

## 2019-08-18 RX ADMIN — Medication 975 MILLIGRAM(S): at 08:09

## 2019-08-18 RX ADMIN — Medication 600 MILLIGRAM(S): at 01:04

## 2019-08-18 RX ADMIN — Medication 975 MILLIGRAM(S): at 16:10

## 2019-08-18 RX ADMIN — Medication 600 MILLIGRAM(S): at 12:50

## 2019-08-18 RX ADMIN — Medication 600 MILLIGRAM(S): at 00:34

## 2019-08-18 RX ADMIN — Medication 600 MILLIGRAM(S): at 12:12

## 2019-08-18 RX ADMIN — Medication 975 MILLIGRAM(S): at 15:35

## 2019-08-18 RX ADMIN — Medication 600 MILLIGRAM(S): at 19:15

## 2019-08-18 RX ADMIN — Medication 975 MILLIGRAM(S): at 08:40

## 2019-08-18 RX ADMIN — Medication 975 MILLIGRAM(S): at 03:19

## 2019-08-18 RX ADMIN — Medication 975 MILLIGRAM(S): at 03:49

## 2019-08-18 RX ADMIN — Medication 600 MILLIGRAM(S): at 05:53

## 2019-08-18 RX ADMIN — Medication 975 MILLIGRAM(S): at 21:07

## 2019-08-18 NOTE — PROGRESS NOTE ADULT - PROBLEM SELECTOR PLAN 1
- Pain well controlled, continue current pain regimen  - Continue ambulation, SCDs when not ambulating  - Continue regular diet  - f/u AM H/H    Leslie Smith, PGY-1

## 2019-08-18 NOTE — PROGRESS NOTE ADULT - ATTENDING COMMENTS
Doing well  VSS Afeb  Abd S NT ND FF min lochia  inc c/d/i  S/P , stable  Hgb low, will rpt to check stability  Routine Care

## 2019-08-18 NOTE — PROGRESS NOTE ADULT - SUBJECTIVE AND OBJECTIVE BOX
OB Progress Note:  PPD#1    S: 38yo PPD#1 s/p . Patient feels well. Pain is well controlled. She is tolerating a regular diet and passing flatus. She is voiding spontaneously, and ambulating without difficulty. Endorses light vaginal bleeding, soaking less than 1 pad/hour. Denies CP/SOB. Denies lightheadedness/dizziness. Denies N/V.     O:  Vitals:  Vital Signs Last 24 Hrs  T(C): 37.3 (18 Aug 2019 00:33), Max: 37.3 (18 Aug 2019 00:33)  T(F): 99.2 (18 Aug 2019 00:33), Max: 99.2 (18 Aug 2019 00:33)  HR: 83 (18 Aug 2019 00:33) (75 - 97)  BP: 92/60 (18 Aug 2019 00:33) (90/63 - 112/65)  BP(mean): --  RR: 18 (18 Aug 2019 00:33) (10 - 18)  SpO2: 97% (18 Aug 2019 00:33) (97% - 98%)    MEDICATIONS  (STANDING):  acetaminophen   Tablet .. 975 milliGRAM(s) Oral <User Schedule>  diphtheria/tetanus/pertussis (acellular) Vaccine (ADAcel) 0.5 milliLiter(s) IntraMuscular once  ibuprofen  Tablet. 600 milliGRAM(s) Oral every 6 hours  oxytocin Infusion 333.333 milliUNIT(s)/Min (1000 mL/Hr) IV Continuous <Continuous>  prenatal multivitamin 1 Tablet(s) Oral daily  sodium chloride 0.9% lock flush 3 milliLiter(s) IV Push every 8 hours      Labs:  Blood type: O Positive  Rubella IgG: RPR: Negative                          10.7<L>   12.8<H> >-----------< 297    ( 17 @ 15:20 )             31.8<L>                  Physical Exam:  General: NAD  Heart: all extremities well perfused  Lungs: breathing comfortably  Abdomen: soft, non-tender, non-distended, fundus firm  Vaginal: lochia wnl  Extremities: No calf tenderness or erythema

## 2019-08-19 ENCOUNTER — TRANSCRIPTION ENCOUNTER (OUTPATIENT)
Age: 39
End: 2019-08-19

## 2019-08-19 VITALS
HEART RATE: 70 BPM | SYSTOLIC BLOOD PRESSURE: 99 MMHG | TEMPERATURE: 98 F | RESPIRATION RATE: 18 BRPM | DIASTOLIC BLOOD PRESSURE: 64 MMHG | OXYGEN SATURATION: 98 %

## 2019-08-19 PROCEDURE — 86780 TREPONEMA PALLIDUM: CPT

## 2019-08-19 PROCEDURE — 86901 BLOOD TYPING SEROLOGIC RH(D): CPT

## 2019-08-19 PROCEDURE — 86900 BLOOD TYPING SEROLOGIC ABO: CPT

## 2019-08-19 PROCEDURE — 59025 FETAL NON-STRESS TEST: CPT

## 2019-08-19 PROCEDURE — 86850 RBC ANTIBODY SCREEN: CPT

## 2019-08-19 PROCEDURE — 85018 HEMOGLOBIN: CPT

## 2019-08-19 PROCEDURE — 59050 FETAL MONITOR W/REPORT: CPT

## 2019-08-19 PROCEDURE — 85014 HEMATOCRIT: CPT

## 2019-08-19 PROCEDURE — G0463: CPT

## 2019-08-19 PROCEDURE — 85027 COMPLETE CBC AUTOMATED: CPT

## 2019-08-19 RX ADMIN — Medication 600 MILLIGRAM(S): at 12:44

## 2019-08-19 RX ADMIN — Medication 975 MILLIGRAM(S): at 09:40

## 2019-08-19 RX ADMIN — Medication 1 TABLET(S): at 12:14

## 2019-08-19 RX ADMIN — Medication 600 MILLIGRAM(S): at 06:13

## 2019-08-19 RX ADMIN — Medication 600 MILLIGRAM(S): at 00:01

## 2019-08-19 RX ADMIN — Medication 975 MILLIGRAM(S): at 04:02

## 2019-08-19 RX ADMIN — Medication 100 MILLIGRAM(S): at 12:14

## 2019-08-19 RX ADMIN — Medication 975 MILLIGRAM(S): at 03:32

## 2019-08-19 RX ADMIN — Medication 600 MILLIGRAM(S): at 00:31

## 2019-08-19 RX ADMIN — Medication 600 MILLIGRAM(S): at 06:43

## 2019-08-19 RX ADMIN — Medication 975 MILLIGRAM(S): at 09:10

## 2019-08-19 RX ADMIN — Medication 600 MILLIGRAM(S): at 12:14

## 2019-08-19 NOTE — DISCHARGE NOTE OB - CARE PLAN
Principal Discharge DX:	 (normal spontaneous vaginal delivery)  Goal:	post partum care  Assessment and plan of treatment:	6 week followup, moderate activity, regular diet

## 2019-08-19 NOTE — DISCHARGE NOTE OB - PATIENT PORTAL LINK FT
You can access the [facility] Patient Portal, offered by [facility], by registering with the following website: [email or web address]

## 2019-08-19 NOTE — DISCHARGE NOTE OB - MATERIALS PROVIDED
[facility] [place] Screening Program/Back To Sleep Handout/Shaken Baby Prevention Handout/Breastfeeding Guide and Packet/Birth Certificate Instructions/Discharge Medication Information for Patients and Families Pocket Guide/[place]  Immunization Record/Breastfeeding Log/[facility] Hearing Screen Program/Vaccinations/Breastfeeding Mother’s Support Group Information/Guide to Postpartum Care

## 2019-08-19 NOTE — DISCHARGE NOTE OB - CARE PROVIDER_API CALL
Rip Siu (MD)  Obstetrics and Gynecology  [address] [place] [address]  Phone: [phone]  Fax: [phone]  Follow Up Time:

## 2019-08-19 NOTE — DISCHARGE NOTE OB - HOSPITAL COURSE
term pregnancy, labor, , repair of vaginal laceration routine, Dr Siu, post partum stable, Anemia noted due to acute blood loss at delivery, stable HCT, no active bleeding.
